# Patient Record
Sex: MALE | Race: WHITE | NOT HISPANIC OR LATINO | URBAN - METROPOLITAN AREA
[De-identification: names, ages, dates, MRNs, and addresses within clinical notes are randomized per-mention and may not be internally consistent; named-entity substitution may affect disease eponyms.]

---

## 2022-03-15 ENCOUNTER — OFFICE VISIT (OUTPATIENT)
Dept: URBAN - METROPOLITAN AREA CLINIC 78 | Facility: CLINIC | Age: 37
End: 2022-03-15
Payer: COMMERCIAL

## 2022-03-15 VITALS
DIASTOLIC BLOOD PRESSURE: 81 MMHG | HEART RATE: 74 BPM | TEMPERATURE: 97.6 F | WEIGHT: 198 LBS | BODY MASS INDEX: 27.72 KG/M2 | SYSTOLIC BLOOD PRESSURE: 125 MMHG | HEIGHT: 71 IN

## 2022-03-15 DIAGNOSIS — K62.5 RECTAL BLEEDING: ICD-10-CM

## 2022-03-15 DIAGNOSIS — R19.7 DIARRHEA, UNSPECIFIED TYPE: ICD-10-CM

## 2022-03-15 DIAGNOSIS — K51.90 ULCERATIVE COLITIS: ICD-10-CM

## 2022-03-15 DIAGNOSIS — Z83.79 FAMILY HISTORY OF ULCERATIVE COLITIS: ICD-10-CM

## 2022-03-15 PROCEDURE — 99204 OFFICE O/P NEW MOD 45 MIN: CPT | Performed by: INTERNAL MEDICINE

## 2022-03-15 RX ORDER — SODIUM PICOSULFATE, MAGNESIUM OXIDE, AND ANHYDROUS CITRIC ACID 10; 3.5; 12 MG/160ML; G/160ML; G/160ML
160ML AS DIRECTED LIQUID ORAL ONCE
Qty: 1 | Refills: 0 | OUTPATIENT
Start: 2022-03-15 | End: 2022-03-16

## 2022-03-15 NOTE — HPI-TODAY'S VISIT:
The patient comes in self-referred. A copy of this note will be sent to the referring physician.   The patient reports he was diagnosed with UC in 2005. He was initially having over 20 loose stools daily. His last colonosocpy was in 2007. He has not followed routinely.  He felt he was in remission after taking Mercaptopurine/6MP, which he took for 6 months. He had been tried on Asacol without much response.   He has never had solid stools since 2004. He is now having ~10 BM's daily. He has noted blood in the TP but not mixed in the stools or in the toilet bowl.  He has associated dull abdominal pain, although it tends to be stabbing right before he has to have a BM. It can range anywhere between 2-8/10 in intensity.  He has never had C diff.    He denies bloating, anorexia or unintentional weight loss.  He has had some intentional weight loss.  Regarding any upper GI complaints, the patient has not had heartburn, nausea, vomiting or dysphagia.   The patient does not take blood thinners.  They deny any CP or RAMIREZ.  There is a FH of colon cancer in his GF, no FH of colon polyps. His mother also has UC. No FH of celiac sprue.

## 2022-04-12 ENCOUNTER — OFFICE VISIT (OUTPATIENT)
Dept: URBAN - METROPOLITAN AREA MEDICAL CENTER 10 | Facility: MEDICAL CENTER | Age: 37
End: 2022-04-12
Payer: COMMERCIAL

## 2022-04-12 DIAGNOSIS — K51.011 CHRONIC ULCERATIVE ENTEROCOLITIS WITH RECTAL BLEEDING: ICD-10-CM

## 2022-04-12 DIAGNOSIS — R10.84 ABDOMINAL CRAMPING, GENERALIZED: ICD-10-CM

## 2022-04-12 DIAGNOSIS — R19.7 ACUTE DIARRHEA: ICD-10-CM

## 2022-04-12 PROCEDURE — 45380 COLONOSCOPY AND BIOPSY: CPT | Performed by: INTERNAL MEDICINE

## 2022-04-15 ENCOUNTER — TELEPHONE ENCOUNTER (OUTPATIENT)
Dept: URBAN - METROPOLITAN AREA CLINIC 78 | Facility: CLINIC | Age: 37
End: 2022-04-15

## 2022-04-28 ENCOUNTER — OFFICE VISIT (OUTPATIENT)
Dept: URBAN - METROPOLITAN AREA CLINIC 78 | Facility: CLINIC | Age: 37
End: 2022-04-28
Payer: COMMERCIAL

## 2022-04-28 VITALS — BODY MASS INDEX: 28.67 KG/M2 | WEIGHT: 204.8 LBS | HEIGHT: 71 IN

## 2022-04-28 DIAGNOSIS — R19.7 DIARRHEA, UNSPECIFIED TYPE: ICD-10-CM

## 2022-04-28 DIAGNOSIS — K62.5 RECTAL BLEEDING: ICD-10-CM

## 2022-04-28 DIAGNOSIS — Z83.79 FAMILY HISTORY OF ULCERATIVE COLITIS: ICD-10-CM

## 2022-04-28 DIAGNOSIS — R41.89 BRAIN FOG: ICD-10-CM

## 2022-04-28 DIAGNOSIS — K51.90 ULCERATIVE COLITIS: ICD-10-CM

## 2022-04-28 PROCEDURE — 99215 OFFICE O/P EST HI 40 MIN: CPT | Performed by: INTERNAL MEDICINE

## 2022-04-28 RX ORDER — MESALAMINE 1.2 G/1
4 TABLETS TABLET, DELAYED RELEASE ORAL ONCE A DAY
Qty: 360 TABLET | Refills: 2 | OUTPATIENT
Start: 2022-04-28 | End: 2023-01-22

## 2022-04-28 NOTE — HPI-TODAY'S VISIT:
The patient comes in self-referred. A copy of this note will be sent to the referring physician.   The patient reports he was diagnosed with UC in 2005. He was initially having over 20 loose stools daily. His last colonosocpy was in 2007. He has not followed routinely with a GI.  He felt he was in remission after taking Mercaptopurine/6MP, which he took for 6 months. He had been tried on Asacol without much response.   He has never had solid stools since 2004. He is now having ~10 BM's daily. He has noted blood in the TP but not mixed in the stools or in the toilet bowl. He states he has very little QOL. He has started using Imodium which has cut down his diarrhea somewhat. Some days he might only take 2 Imodium but feels a bit constipated and bloated. He has associated dull abdominal pain, although it tends to be stabbing right before he has to have a BM. It can range anywhere between 2-8/10 in intensity.  He has never had C diff.   He denies bloating, anorexia or unintentional weight loss.  He has had some intentional weight loss.  Regarding any upper GI complaints, the patient has not had heartburn, nausea, vomiting or dysphagia.  He told me today that he has been feeling some brain fog lately.   Today we reviewed the results of his recent colonoscopy and path which disclosed low grade dysplasia. This findings were discussed during inter-departmental Path conference at [place] and all pathologists agreed. There was loss of p53 which also raised the concern of several pathologists.  The patient does not take blood thinners.  They deny any CP or RAMIREZ.  There is a FH of colon cancer in his GF, no FH of colon polyps. His mother also has UC. No FH of celiac sprue.   Summary of prior workup: - Colonoscopy by me on 4/12/2022 revealed right colon biopsies consistent with focally active chronic colitis and focal glandular atypia favoring low-grade dysplasia.  No granulomas.  Left colon biopsies also showed chronic focally active colitis.

## 2022-05-08 ENCOUNTER — WEB ENCOUNTER (OUTPATIENT)
Dept: URBAN - METROPOLITAN AREA CLINIC 78 | Facility: CLINIC | Age: 37
End: 2022-05-08

## 2022-05-12 ENCOUNTER — WEB ENCOUNTER (OUTPATIENT)
Dept: URBAN - METROPOLITAN AREA CLINIC 78 | Facility: CLINIC | Age: 37
End: 2022-05-12

## 2022-05-23 ENCOUNTER — TELEPHONE ENCOUNTER (OUTPATIENT)
Dept: URBAN - METROPOLITAN AREA CLINIC 78 | Facility: CLINIC | Age: 37
End: 2022-05-23

## 2022-05-23 RX ORDER — MERCAPTOPURINE 50 MG/1
3 TABLETS TABLET ORAL DAILY
Qty: 270 TABLETS | Refills: 3 | OUTPATIENT

## 2022-05-24 LAB
ENDOMYSIAL ANTIBODY IGA: NEGATIVE
IMMUNOGLOBULIN A, QN, SERUM: 252
INTERPRETATION:: (no result)
Lab: (no result)
T-TRANSGLUTAMINASE (TTG) IGA: <2
TPMT ACTIVITY: 19.8

## 2022-05-31 ENCOUNTER — OFFICE VISIT (OUTPATIENT)
Dept: URBAN - METROPOLITAN AREA CLINIC 78 | Facility: CLINIC | Age: 37
End: 2022-05-31
Payer: COMMERCIAL

## 2022-05-31 VITALS — BODY MASS INDEX: 28.67 KG/M2 | WEIGHT: 204.8 LBS | HEIGHT: 71 IN

## 2022-05-31 DIAGNOSIS — R41.89 BRAIN FOG: ICD-10-CM

## 2022-05-31 DIAGNOSIS — R19.7 DIARRHEA, UNSPECIFIED TYPE: ICD-10-CM

## 2022-05-31 DIAGNOSIS — Z83.79 FAMILY HISTORY OF ULCERATIVE COLITIS: ICD-10-CM

## 2022-05-31 DIAGNOSIS — K51.90 ULCERATIVE COLITIS: ICD-10-CM

## 2022-05-31 DIAGNOSIS — K62.5 RECTAL BLEEDING: ICD-10-CM

## 2022-05-31 PROCEDURE — 99215 OFFICE O/P EST HI 40 MIN: CPT | Performed by: INTERNAL MEDICINE

## 2022-05-31 RX ORDER — INFLIXIMAB 100 MG/10ML
5 MG/KG AT 0, 2, AND 6 WEEKS, FOLLOWED BY 5 MG/KG EVERY 8 WEEKS THEREAFTER INJECTION, POWDER, LYOPHILIZED, FOR SOLUTION INTRAVENOUS
Qty: 10 | Refills: 3 | OUTPATIENT
Start: 2022-05-31 | End: 2023-01-25

## 2022-05-31 RX ORDER — MERCAPTOPURINE 50 MG/1
3 TABLETS TABLET ORAL DAILY
Qty: 270 TABLETS | Refills: 3 | Status: ACTIVE | COMMUNITY

## 2022-05-31 RX ORDER — MESALAMINE 1.2 G/1
4 TABLETS TABLET, DELAYED RELEASE ORAL ONCE A DAY
Qty: 360 TABLET | Refills: 2 | Status: ACTIVE | COMMUNITY
Start: 2022-04-28 | End: 2023-01-22

## 2022-05-31 NOTE — HPI-TODAY'S VISIT:
The patient comes in self-referred. A copy of this note will be sent to the referring physician.   The patient reports he was diagnosed with UC in 2005. He was initially having over 20 loose stools daily. He has not followed routinely with a GI.  He felt he was in remission after taking Mercaptopurine/6MP, which he took for 6 months. He had been tried on Asacol without much response.   He has never had solid stools since 2004. He was then having ~10 BM's daily. He has noted blood in the TP but not mixed in the stools or in the toilet bowl. He was having very little QOL. He has started using Imodium which has cut down his diarrhea somewhat.The Lomotil has really helped though and he is now only having 1-2 more formed BM's/day. He has not seen blood in the stools.  The abdominal pain is only mild now.   He has never had C diff.   He denies bloating, anorexia or unintentional weight loss. He has gained some weight on the steroids.  Regarding any upper GI complaints, the patient has not had heartburn, nausea, vomiting or dysphagia.  He has had some brain fog lately.   Today we once agian reviewed the results of his recent colonoscopy and path which disclosed low grade dysplasia. This findings were discussed during inter-departmental Path conference at [place] and all pathologists agreed. There was loss of p53 which also raised the concern of several pathologists. He eventually met with Dr. Farhad Winter form [facility]. It was felt better to treat his underlying UC and reassess, prior to pursuing colectomy.   The patient does not take blood thinners.  They deny any CP or RAMIREZ.  There is a FH of colon cancer in his GF, no FH of colon polyps. His mother also has UC. No FH of celiac sprue.   Summary of prior workup: - Labs May 2022: TPMT activity level normal at 19.8, celiac disease panel negative. - Colonoscopy by me on 4/12/2022 revealed right colon biopsies consistent with focally active chronic colitis and focal glandular atypia favoring low-grade dysplasia.  No granulomas.  Left colon biopsies also showed chronic focally active colitis.

## 2022-06-08 ENCOUNTER — TELEPHONE ENCOUNTER (OUTPATIENT)
Dept: URBAN - METROPOLITAN AREA CLINIC 78 | Facility: CLINIC | Age: 37
End: 2022-06-08

## 2022-06-08 RX ORDER — INFLIXIMAB 100 MG/1
AS DIRECTED INJECTION, POWDER, LYOPHILIZED, FOR SOLUTION INTRAVENOUS
Qty: 10 | Refills: 6 | OUTPATIENT
Start: 2022-06-28 | End: 2023-01-23

## 2022-07-22 ENCOUNTER — TELEPHONE ENCOUNTER (OUTPATIENT)
Dept: URBAN - METROPOLITAN AREA CLINIC 78 | Facility: CLINIC | Age: 37
End: 2022-07-22

## 2022-07-25 ENCOUNTER — OFFICE VISIT (OUTPATIENT)
Dept: URBAN - METROPOLITAN AREA CLINIC 78 | Facility: CLINIC | Age: 37
End: 2022-07-25
Payer: COMMERCIAL

## 2022-07-25 VITALS — BODY MASS INDEX: 30.94 KG/M2 | HEIGHT: 71 IN | WEIGHT: 221 LBS

## 2022-07-25 DIAGNOSIS — Z83.79 FAMILY HISTORY OF ULCERATIVE COLITIS: ICD-10-CM

## 2022-07-25 DIAGNOSIS — R19.7 DIARRHEA, UNSPECIFIED TYPE: ICD-10-CM

## 2022-07-25 DIAGNOSIS — K51.90 ULCERATIVE COLITIS: ICD-10-CM

## 2022-07-25 DIAGNOSIS — K62.5 RECTAL BLEEDING: ICD-10-CM

## 2022-07-25 DIAGNOSIS — R41.89 BRAIN FOG: ICD-10-CM

## 2022-07-25 PROCEDURE — 99214 OFFICE O/P EST MOD 30 MIN: CPT | Performed by: INTERNAL MEDICINE

## 2022-07-25 RX ORDER — MERCAPTOPURINE 50 MG/1
3 TABLETS TABLET ORAL DAILY
Qty: 270 TABLETS | Refills: 3

## 2022-07-25 RX ORDER — MERCAPTOPURINE 50 MG/1
3 TABLETS TABLET ORAL DAILY
Qty: 270 TABLETS | Refills: 3 | Status: ACTIVE | COMMUNITY

## 2022-07-25 RX ORDER — INFLIXIMAB 100 MG/1
AS DIRECTED INJECTION, POWDER, LYOPHILIZED, FOR SOLUTION INTRAVENOUS
Qty: 10 | Refills: 6 | Status: ACTIVE | COMMUNITY
Start: 2022-06-28 | End: 2023-01-23

## 2022-07-25 RX ORDER — MESALAMINE 1.2 G/1
4 TABLETS TABLET, DELAYED RELEASE ORAL ONCE A DAY
Qty: 360 TABLET | Refills: 2 | Status: ACTIVE | COMMUNITY
Start: 2022-04-28 | End: 2023-01-22

## 2022-07-25 RX ORDER — INFLIXIMAB 100 MG/10ML
5 MG/KG AT 0, 2, AND 6 WEEKS, FOLLOWED BY 5 MG/KG EVERY 8 WEEKS THEREAFTER INJECTION, POWDER, LYOPHILIZED, FOR SOLUTION INTRAVENOUS
Qty: 10 | Refills: 3 | Status: ACTIVE | COMMUNITY
Start: 2022-05-31 | End: 2023-01-25

## 2022-07-25 NOTE — HPI-TODAY'S VISIT:
The patient comes in self-referred. A copy of this note will be sent to the referring physician.   The patient reports he was diagnosed with UC in 2005. He was initially having over 20 loose stools daily. He has not followed routinely with a GI.  He felt he was in remission after taking Mercaptopurine/6MP, which he took for 6 months. He had been tried on Asacol without much response.   He has never had solid stools since 2004. He was then having ~10 BM's daily. He has noted blood in the TP but not mixed in the stools or in the toilet bowl. He was having very little QOL. He has started using Imodium which has cut down his diarrhea somewhat.The Lomotil has really helped though and he is now only having 1-2 more formed BM's/day. He has not seen blood in the stools.  The abdominal pain is only mild now.   He has never had C diff.   He denies bloating, anorexia or unintentional weight loss. He has gained some weight on the steroids.  Regarding any upper GI complaints, the patient has not had heartburn, nausea, vomiting or dysphagia.  He is not having much in terms of abdominal pain or rectal bleeding. Most days he is having 4-5 BM's although about once a week he will experience about 20BM's in a day.   His insurance has refused Remicade and will only allow Remicade. They will only cover Renflexis  He has not been taking the Mesalamine as he feels it worsened his symtpoms.   He has had some brain fog lately.   Today we once daniel reviewed the results of his recent colonoscopy and path which disclosed low grade dysplasia. This findings were discussed during inter-departmental Path conference at [place] and all pathologists agreed. There was loss of p53 which also raised the concern of several pathologists. He eventually met with Dr. Farhad Winter form [facility]. It was felt better to treat his underlying UC and reassess, prior to pursuing colectomy.   The patient does not take blood thinners.  They deny any CP or RAMIREZ.  There is a FH of colon cancer in his GF, no FH of colon polyps. His mother also has UC. No FH of celiac sprue.   Summary of prior workup: - Labs May 2022: TPMT activity level normal at 19.8, celiac disease panel negative. - Colonoscopy by me on 4/12/2022 revealed right colon biopsies consistent with focally active chronic colitis and focal glandular atypia favoring low-grade dysplasia.  No granulomas.  Left colon biopsies also showed chronic focally active colitis.

## 2022-07-27 ENCOUNTER — WEB ENCOUNTER (OUTPATIENT)
Dept: URBAN - METROPOLITAN AREA CLINIC 78 | Facility: CLINIC | Age: 37
End: 2022-07-27

## 2022-08-18 ENCOUNTER — TELEPHONE ENCOUNTER (OUTPATIENT)
Dept: URBAN - METROPOLITAN AREA CLINIC 78 | Facility: CLINIC | Age: 37
End: 2022-08-18

## 2022-09-06 ENCOUNTER — OFFICE VISIT (OUTPATIENT)
Dept: URBAN - METROPOLITAN AREA CLINIC 78 | Facility: CLINIC | Age: 37
End: 2022-09-06
Payer: COMMERCIAL

## 2022-09-06 VITALS
BODY MASS INDEX: 31.11 KG/M2 | WEIGHT: 222.2 LBS | RESPIRATION RATE: 16 BRPM | TEMPERATURE: 98.1 F | HEIGHT: 71 IN | DIASTOLIC BLOOD PRESSURE: 90 MMHG | SYSTOLIC BLOOD PRESSURE: 145 MMHG | HEART RATE: 56 BPM

## 2022-09-06 DIAGNOSIS — K51.90 ULCERATIVE COLITIS: ICD-10-CM

## 2022-09-06 DIAGNOSIS — R41.89 BRAIN FOG: ICD-10-CM

## 2022-09-06 DIAGNOSIS — R19.7 DIARRHEA, UNSPECIFIED TYPE: ICD-10-CM

## 2022-09-06 DIAGNOSIS — Z83.79 FAMILY HISTORY OF ULCERATIVE COLITIS: ICD-10-CM

## 2022-09-06 DIAGNOSIS — K62.5 RECTAL BLEEDING: ICD-10-CM

## 2022-09-06 DIAGNOSIS — R09.89 GLOBUS SENSATION: ICD-10-CM

## 2022-09-06 PROCEDURE — 99214 OFFICE O/P EST MOD 30 MIN: CPT | Performed by: INTERNAL MEDICINE

## 2022-09-06 RX ORDER — MESALAMINE 1.2 G/1
4 TABLETS TABLET, DELAYED RELEASE ORAL ONCE A DAY
Qty: 360 TABLET | Refills: 2 | Status: ACTIVE | COMMUNITY
Start: 2022-04-28 | End: 2023-01-22

## 2022-09-06 RX ORDER — MERCAPTOPURINE 50 MG/1
3 TABLETS TABLET ORAL DAILY
Qty: 270 TABLETS | Refills: 3 | Status: ACTIVE | COMMUNITY

## 2022-09-06 RX ORDER — INFLIXIMAB 100 MG/10ML
5 MG/KG AT 0, 2, AND 6 WEEKS, FOLLOWED BY 5 MG/KG EVERY 8 WEEKS THEREAFTER INJECTION, POWDER, LYOPHILIZED, FOR SOLUTION INTRAVENOUS
Qty: 10 | Refills: 3 | Status: ACTIVE | COMMUNITY
Start: 2022-05-31 | End: 2023-01-25

## 2022-09-06 RX ORDER — INFLIXIMAB 100 MG/1
AS DIRECTED INJECTION, POWDER, LYOPHILIZED, FOR SOLUTION INTRAVENOUS
Qty: 10 | Refills: 6 | Status: ACTIVE | COMMUNITY
Start: 2022-06-28 | End: 2023-01-23

## 2022-09-06 NOTE — HPI-TODAY'S VISIT:
The patient comes in self-referred. A copy of this note will be sent to the referring physician.   The patient reports he was diagnosed with UC in 2005. He was initially having over 20 loose stools daily. He has not followed routinely with a GI.  He felt he was in remission after taking Mercaptopurine/6MP briefly. He had been tried on Asacol without much response.   He has never had solid stools since 2004. He was then having ~10 BM's daily. . He has started using Imodium &  Lomotil which have cut down his diarrhea somewhat. He has never had C diff.   His insurance has refused Remicade. Patient received Renflexis on 8/25/22.   He has not been taking the Mesalamine as he feels it worsened his symtpoms.   Colonoscopy in April 2022 disclosed low grade dysplasia. This findings were discussed during inter-departmental Path conference at [place] and all pathologists agreed. There was loss of p53 which also raised the concern of several pathologists. He eventually met with Dr. Farhad Winter form [facility]. It was felt better to treat his underlying UC and reassess, prior to pursuing colectomy.   He denies bloating, anorexia or unintentional weight loss. He has gained some weight on the steroids.  Regarding any upper GI complaints, the patient has not had nausea, vomiting or dysphagia.  He overall feels less abdominal pain. He stopped taking the 6MP. He still has good days and bad days with regards to the diarrhea. He never has less than 5 loose stools daily. He has been using the Lomotil with limited relief. He has a lot of fecal urgency. He has not had blood in the stools. Appetite has been good.   He has had some globus sensation and perhaps mild heartburn. No nausea or vomiting.  He has had less brain fog lately.   The patient does not take blood thinners.  They deny any CP or RAMIREZ.  There is a FH of colon cancer in his GF, no FH of colon polyps. His mother also has UC. No FH of celiac sprue.   He is an  at [employer]. He is on a temporary FMLA as well as short term disability.  He feels he can work the 50 hours/week he should but his days can be unpredictable.   Summary of prior workup: - Labs on 7/29/2022: Glucose 83, BUN 9, creatinine 0.6, sodium 139, potassium 4.4, calcium 9.4, total protein 7.0, albumin 4.4, total bilirubin 0.8, alkaline phosphatase 61, AST 26, ALT 51.  Hepatitis B surface antigen negative.  HBcAb nonreactive. - Labs May 2022: TPMT activity level normal at 19.8, celiac disease panel negative. - Colonoscopy by me on 4/12/2022 revealed right colon biopsies consistent with focally active chronic colitis and focal glandular atypia favoring low-grade dysplasia.  No granulomas.  Left colon biopsies also showed chronic focally active colitis.

## 2022-11-07 ENCOUNTER — OFFICE VISIT (OUTPATIENT)
Dept: URBAN - METROPOLITAN AREA CLINIC 78 | Facility: CLINIC | Age: 37
End: 2022-11-07
Payer: COMMERCIAL

## 2022-11-07 VITALS
TEMPERATURE: 97.8 F | HEART RATE: 76 BPM | HEIGHT: 71 IN | SYSTOLIC BLOOD PRESSURE: 131 MMHG | BODY MASS INDEX: 31.64 KG/M2 | WEIGHT: 226 LBS | DIASTOLIC BLOOD PRESSURE: 78 MMHG

## 2022-11-07 DIAGNOSIS — R19.7 DIARRHEA, UNSPECIFIED TYPE: ICD-10-CM

## 2022-11-07 DIAGNOSIS — R09.89 GLOBUS SENSATION: ICD-10-CM

## 2022-11-07 DIAGNOSIS — K51.90 ULCERATIVE COLITIS: ICD-10-CM

## 2022-11-07 DIAGNOSIS — R41.89 BRAIN FOG: ICD-10-CM

## 2022-11-07 DIAGNOSIS — K62.5 RECTAL BLEEDING: ICD-10-CM

## 2022-11-07 DIAGNOSIS — Z83.79 FAMILY HISTORY OF ULCERATIVE COLITIS: ICD-10-CM

## 2022-11-07 PROBLEM — 64766004 ULCERATIVE COLITIS: Status: ACTIVE | Noted: 2022-03-15

## 2022-11-07 PROCEDURE — 99214 OFFICE O/P EST MOD 30 MIN: CPT | Performed by: INTERNAL MEDICINE

## 2022-11-07 RX ORDER — MERCAPTOPURINE 50 MG/1
3 TABLETS TABLET ORAL DAILY
Qty: 270 TABLETS | Refills: 3 | Status: ACTIVE | COMMUNITY

## 2022-11-07 RX ORDER — MESALAMINE 1.2 G/1
4 TABLETS TABLET, DELAYED RELEASE ORAL ONCE A DAY
Qty: 360 TABLET | Refills: 2 | Status: ACTIVE | COMMUNITY
Start: 2022-04-28 | End: 2023-01-22

## 2022-11-07 RX ORDER — INFLIXIMAB 100 MG/10ML
5 MG/KG AT 0, 2, AND 6 WEEKS, FOLLOWED BY 5 MG/KG EVERY 8 WEEKS THEREAFTER INJECTION, POWDER, LYOPHILIZED, FOR SOLUTION INTRAVENOUS
Qty: 10 | Refills: 3 | Status: ACTIVE | COMMUNITY
Start: 2022-05-31 | End: 2023-01-25

## 2022-11-07 RX ORDER — INFLIXIMAB 100 MG/1
AS DIRECTED INJECTION, POWDER, LYOPHILIZED, FOR SOLUTION INTRAVENOUS
Qty: 10 | Refills: 6 | Status: ACTIVE | COMMUNITY
Start: 2022-06-28 | End: 2023-01-23

## 2022-11-07 NOTE — HPI-TODAY'S VISIT:
The patient comes in self-referred. A copy of this note will be sent to the referring physician.   The patient reports he was diagnosed with UC in . He was initially having over 20 loose stools daily. He has not followed routinely with a GI.  He felt he was in remission after taking Mercaptopurine/6MP briefly. He had been tried on Asacol without much response.   He has never had solid stools since . He was then having ~10 BM's daily. He has never had C diff.   His insurance has refused Remicade. Patient received Renflexis on 22.   His last Renflexis infusion was on 22. He was told he could not get additional infusions until he paid a $400 balance. He has not received the 3rd loading dose or any other infusions since.  He has not been taking the Mesalamine as he feels it worsened his symtpoms. He agreed to continue taking the 6MP in the interim.   He has not had any rectal bleeding or abdominal pain. Appetite has been good.  He never has less than 5 loose stools daily. He has been using the Lomotil with limited relief. He has a lot of fecal urgency.  Colonoscopy in 2022 disclosed low grade dysplasia. This findings were discussed during inter-departmental Path conference at [place] and all pathologists agreed. There was loss of p53 which also raised the concern of several pathologists. He eventually met with Dr. Farhad Winter form [facility]. It was felt better to treat his underlying UC and reassess, prior to pursuing colectomy.   He denies bloating, anorexia or unintentional weight loss. He has gained some weight on the steroids.  Regarding any upper GI complaints, the patient has not had nausea, vomiting or dysphagia.  He has had some globus sensation and perhaps mild heartburn. No nausea or vomiting.  He has had less brain fog lately.   The patient does not take blood thinners.  They deny any CP or RAMIREZ.  There is a FH of colon cancer in his GF, no FH of colon polyps. His mother also has UC. No FH of celiac sprue.   He is an  at [employer]. He is on a temporary FMLA as well as short term disability.  He feels he can work the 50 hours/week he should but his days can be unpredictable.   He just had a  2 weeks ago. He has been out of work for the last couple of weeks because of this.  Summary of prior workup: - Labs on 2022: Glucose 83, BUN 9, creatinine 0.6, sodium 139, potassium 4.4, calcium 9.4, total protein 7.0, albumin 4.4, total bilirubin 0.8, alkaline phosphatase 61, AST 26, ALT 51.  Hepatitis B surface antigen negative.  HBcAb nonreactive. - Labs May 2022: TPMT activity level normal at 19.8, celiac disease panel negative. - Colonoscopy by me on 2022 revealed right colon biopsies consistent with focally active chronic colitis and focal glandular atypia favoring low-grade dysplasia.  No granulomas.  Left colon biopsies also showed chronic focally active colitis.

## 2023-04-20 ENCOUNTER — TELEPHONE ENCOUNTER (OUTPATIENT)
Dept: URBAN - METROPOLITAN AREA CLINIC 78 | Facility: CLINIC | Age: 38
End: 2023-04-20

## 2023-05-22 ENCOUNTER — TELEPHONE ENCOUNTER (OUTPATIENT)
Dept: URBAN - METROPOLITAN AREA CLINIC 78 | Facility: CLINIC | Age: 38
End: 2023-05-22

## 2023-05-22 RX ORDER — INFLIXIMAB-DYYB 100 MG/10ML
5MG/KG IV INFUSION INJECTION, POWDER, LYOPHILIZED, FOR SOLUTION INTRAVENOUS
Qty: 9 | Refills: 5 | OUTPATIENT
Start: 2023-05-22 | End: 2029-05-20

## 2024-04-15 ENCOUNTER — OV EP (OUTPATIENT)
Dept: URBAN - METROPOLITAN AREA CLINIC 78 | Facility: CLINIC | Age: 39
End: 2024-04-15
Payer: COMMERCIAL

## 2024-04-15 VITALS
BODY MASS INDEX: 31.92 KG/M2 | WEIGHT: 228 LBS | HEART RATE: 62 BPM | SYSTOLIC BLOOD PRESSURE: 145 MMHG | TEMPERATURE: 98 F | HEIGHT: 71 IN | DIASTOLIC BLOOD PRESSURE: 88 MMHG

## 2024-04-15 DIAGNOSIS — K62.5 RECTAL BLEEDING: ICD-10-CM

## 2024-04-15 DIAGNOSIS — R19.7 DIARRHEA, UNSPECIFIED TYPE: ICD-10-CM

## 2024-04-15 DIAGNOSIS — K51.90 ULCERATIVE COLITIS: ICD-10-CM

## 2024-04-15 DIAGNOSIS — Z83.79 FAMILY HISTORY OF ULCERATIVE COLITIS: ICD-10-CM

## 2024-04-15 PROCEDURE — 99215 OFFICE O/P EST HI 40 MIN: CPT | Performed by: INTERNAL MEDICINE

## 2024-04-15 RX ORDER — MERCAPTOPURINE 50 MG/1
3 TABLETS TABLET ORAL DAILY
Qty: 270 TABLETS | Refills: 3 | Status: ACTIVE | COMMUNITY

## 2024-04-15 RX ORDER — SODIUM PICOSULFATE, MAGNESIUM OXIDE, AND ANHYDROUS CITRIC ACID 12; 3.5; 1 G/175ML; G/175ML; MG/175ML
175 ML THE FIRST DOSE THE EVENING BEFORE AND SECOND DOSE THE MORNING OF COLONOSCOPY LIQUID ORAL ONCE A DAY
Qty: 350 | OUTPATIENT
Start: 2024-04-15 | End: 2024-04-17

## 2024-04-15 RX ORDER — INFLIXIMAB-DYYB 100 MG/10ML
5MG/KG IV INFUSION INJECTION, POWDER, LYOPHILIZED, FOR SOLUTION INTRAVENOUS
Qty: 9 | Refills: 5 | Status: ACTIVE | COMMUNITY
Start: 2023-05-22 | End: 2029-05-20

## 2024-04-15 NOTE — HPI-TODAY'S VISIT:
The patient comes in self-referred. A copy of this note will be sent to the referring physician.  I last saw him in Nov 2022. He has issues with non-compliance.   He was diagnosed with UC in 2005. He was initially having over 20 loose stools daily. He has never followed routinely with a GI.  He felt he was in remission after taking Mercaptopurine/6MP briefly. He had been tried on Asacol without much response. He has never had solid stools since 2004. He has never had C diff.   Of note, colonoscopy in April 2022 disclosed low grade dysplasia. This findings were discussed during inter-departmental Path conference at [place] and all pathologists agreed. There was loss of p53 which also raised the concern of several pathologists. He eventually met with Dr. Farhad Winter form [facility]. It was felt better to treat his underlying UC and reassess, prior to pursuing colectomy.  Patient received Renflexis on 8/25/22. His last Renflexis infusion was on 9/6/22. He was told he could not get additional infusions until he paid a $400 balance. Inflectra was subsequently approved. He eventually stopped it ~Mikeyer 2023. He has not been taking Mesalamine as he feels it worsened his symtpoms. He agreed to continue taking the 6MP in the interim.    He called our office last week after not being seen for ovre 1 year for me to give him steroids for a current flare. He is having a lot of gas, abdominal pain, rectal bleeding and urgency. He saw his PCP who gave him a shot of steroids and gave him a prednisone 60mg daily. He is also taking 12 mg Imodium daily. This is helping control the number of BMs. He states he is now having abotu 3-4 BMs a day.  His PCP did not give him instructions on how to taper the dose. He has been taking Prednisone 60mg for 3 days, then 50mg for the past 10 days.  He states the rectal bleeding and abdominal pain have improved. Over the past 4 weeks he had lost 20 lbs.   He denies bloating, anorexia or unintentional weight loss.  Regarding any upper GI complaints, the patient has not had nausea, vomiting or dysphagia.  He has had some globus sensation and perhaps mild heartburn. No nausea or vomiting.  The patient does not take blood thinners.  They deny any CP or RAMIREZ.  There is a FH of colon cancer in his GF, no FH of colon polyps. His mother also has UC. No FH of celiac sprue.   He is an  at [employer]. He has 2 young sons.   Summary of prior workup: - Labs on 7/29/2022: Glucose 83, BUN 9, creatinine 0.6, sodium 139, potassium 4.4, calcium 9.4, total protein 7.0, albumin 4.4, total bilirubin 0.8, alkaline phosphatase 61, AST 26, ALT 51.  Hepatitis B surface antigen negative.  HBcAb nonreactive. - Labs May 2022: TPMT activity level normal at 19.8, celiac disease panel negative. - Colonoscopy by me on 4/12/2022 revealed right colon biopsies consistent with focally active chronic colitis and focal glandular atypia favoring low-grade dysplasia.  No granulomas.  Left colon biopsies also showed chronic focally active colitis.

## 2024-04-24 ENCOUNTER — COLON (OUTPATIENT)
Dept: URBAN - METROPOLITAN AREA SURGERY CENTER 15 | Facility: SURGERY CENTER | Age: 39
End: 2024-04-24

## 2024-04-24 RX ORDER — INFLIXIMAB-DYYB 100 MG/10ML
5MG/KG IV INFUSION INJECTION, POWDER, LYOPHILIZED, FOR SOLUTION INTRAVENOUS
Qty: 9 | Refills: 5 | Status: ACTIVE | COMMUNITY
Start: 2023-05-22 | End: 2029-05-20

## 2024-04-24 RX ORDER — MERCAPTOPURINE 50 MG/1
3 TABLETS TABLET ORAL DAILY
Qty: 270 TABLETS | Refills: 3 | Status: ACTIVE | COMMUNITY